# Patient Record
Sex: FEMALE | URBAN - METROPOLITAN AREA
[De-identification: names, ages, dates, MRNs, and addresses within clinical notes are randomized per-mention and may not be internally consistent; named-entity substitution may affect disease eponyms.]

---

## 2024-06-28 ENCOUNTER — OFFICE VISIT (OUTPATIENT)
Dept: URBAN - METROPOLITAN AREA CLINIC 88 | Facility: CLINIC | Age: 24
End: 2024-06-28

## 2024-09-05 ENCOUNTER — DASHBOARD ENCOUNTERS (OUTPATIENT)
Age: 24
End: 2024-09-05

## 2024-09-05 ENCOUNTER — OFFICE VISIT (OUTPATIENT)
Dept: URBAN - METROPOLITAN AREA CLINIC 118 | Facility: CLINIC | Age: 24
End: 2024-09-05
Payer: COMMERCIAL

## 2024-09-05 ENCOUNTER — LAB OUTSIDE AN ENCOUNTER (OUTPATIENT)
Dept: URBAN - METROPOLITAN AREA CLINIC 118 | Facility: CLINIC | Age: 24
End: 2024-09-05

## 2024-09-05 VITALS
TEMPERATURE: 97.2 F | WEIGHT: 144.4 LBS | SYSTOLIC BLOOD PRESSURE: 113 MMHG | HEART RATE: 78 BPM | DIASTOLIC BLOOD PRESSURE: 70 MMHG | HEIGHT: 64 IN | BODY MASS INDEX: 24.65 KG/M2

## 2024-09-05 DIAGNOSIS — R14.0 ABDOMINAL BLOATING: ICD-10-CM

## 2024-09-05 DIAGNOSIS — R14.3 EXCESSIVE GAS: ICD-10-CM

## 2024-09-05 PROCEDURE — 99204 OFFICE O/P NEW MOD 45 MIN: CPT | Performed by: INTERNAL MEDICINE

## 2024-09-05 RX ORDER — HYOSCYAMINE SULFATE 0.12 MG/1
1 TABLET AS NEEDED TABLET ORAL
Qty: 90 | Refills: 5 | OUTPATIENT
Start: 2024-09-05 | End: 2025-03-03

## 2024-09-05 NOTE — HPI-TODAY'S VISIT:
pt presents for GI evaluation. pt reports recent 3-4 months noted gas, bloating with abdominal discomfort. pt reports symptoms now with any foods. pt notes symptoms progressively worst as day goes on. pt notes gerd, denies other UGI symptoms. pt notes occasional rectal bleeding, denies other LGI symptoms. No recent diet or med changes. Nsaids prn, denies other G symptoms. Denies anxiety/stress. No other complaints.

## 2024-09-06 LAB
ALMOND (F20) IGE: <0.1
CASHEW NUT (F202) IGE: <0.1
CLASS: (no result)
CLASS: 0
CODFISH (F3) IGE: <0.1
COW'S MILK (F2) IGE: <0.1
EGG WHITE (F1) IGE: <0.1
HAZELNUT (F17) IGE: <0.1
IMMUNOGLOBULIN A: 352
INTERPRETATION: (no result)
INTERPRETATION: (no result)
PEANUT (F13) IGE: 0.11
SALMON (F41) IGE: <0.1
SCALLOP (F338) IGE: 0.1
SESAME SEED (F10) IGE: 0.11
SHRIMP (F24) IGE: 0.14
SOYBEAN (F14) IGE: <0.1
TISSUE TRANSGLUTAMINASE AB, IGA: <1
TUNA (F40) IGE: <0.1
WALNUT (F256) IGE: <0.1
WHEAT (F4) IGE: 0.11